# Patient Record
Sex: MALE | Race: BLACK OR AFRICAN AMERICAN
[De-identification: names, ages, dates, MRNs, and addresses within clinical notes are randomized per-mention and may not be internally consistent; named-entity substitution may affect disease eponyms.]

---

## 2018-07-02 ENCOUNTER — HOSPITAL ENCOUNTER (EMERGENCY)
Dept: HOSPITAL 84 - D.ER | Age: 33
Discharge: HOME | End: 2018-07-02
Payer: COMMERCIAL

## 2018-07-02 VITALS — SYSTOLIC BLOOD PRESSURE: 158 MMHG | DIASTOLIC BLOOD PRESSURE: 93 MMHG

## 2018-07-02 VITALS — BODY MASS INDEX: 38.36 KG/M2 | WEIGHT: 315 LBS | HEIGHT: 76 IN

## 2018-07-02 DIAGNOSIS — K85.90: ICD-10-CM

## 2018-07-02 DIAGNOSIS — R10.9: Primary | ICD-10-CM

## 2018-07-02 DIAGNOSIS — F17.200: ICD-10-CM

## 2018-07-02 LAB
ALBUMIN SERPL-MCNC: 3.4 G/DL (ref 3.4–5)
ALP SERPL-CCNC: 82 U/L (ref 46–116)
ALT SERPL-CCNC: 48 U/L (ref 10–68)
AMYLASE SERPL-CCNC: 80 U/L (ref 25–115)
ANION GAP SERPL CALC-SCNC: 14.6 MMOL/L (ref 8–16)
APPEARANCE UR: CLEAR
BACTERIA #/AREA URNS HPF: (no result) /HPF
BASOPHILS NFR BLD AUTO: 0.1 % (ref 0–2)
BILIRUB SERPL-MCNC: 0.74 MG/DL (ref 0.2–1.3)
BILIRUB SERPL-MCNC: NEGATIVE MG/DL
BUN SERPL-MCNC: 4 MG/DL (ref 7–18)
CALCIUM SERPL-MCNC: 8.7 MG/DL (ref 8.5–10.1)
CHLORIDE SERPL-SCNC: 101 MMOL/L (ref 98–107)
CO2 SERPL-SCNC: 26.8 MMOL/L (ref 21–32)
COLOR UR: YELLOW
CREAT SERPL-MCNC: 0.9 MG/DL (ref 0.6–1.3)
EOSINOPHIL NFR BLD: 0.7 % (ref 0–7)
ERYTHROCYTE [DISTWIDTH] IN BLOOD BY AUTOMATED COUNT: 14.2 % (ref 11.5–14.5)
ETHANOL SERPL-MCNC: 1 MG/DL (ref 0–10)
GLOBULIN SER-MCNC: 4 G/L
GLUCOSE SERPL-MCNC: 113 MG/DL (ref 74–106)
GLUCOSE SERPL-MCNC: NEGATIVE MG/DL
HCT VFR BLD CALC: 38.8 % (ref 42–54)
HGB BLD-MCNC: 13.3 G/DL (ref 13.5–17.5)
IMM GRANULOCYTES NFR BLD: 0.2 % (ref 0–5)
KETONES UR STRIP-MCNC: (no result) MG/DL
LIPASE SERPL-CCNC: 623 U/L (ref 73–393)
LYMPHOCYTES NFR BLD AUTO: 10.5 % (ref 15–50)
MCH RBC QN AUTO: 31.4 PG (ref 26–34)
MCHC RBC AUTO-ENTMCNC: 34.3 G/DL (ref 31–37)
MCV RBC: 91.7 FL (ref 80–100)
MONOCYTES NFR BLD: 12.7 % (ref 2–11)
MUCOUS THREADS #/AREA URNS LPF: (no result) /LPF
NEUTROPHILS NFR BLD AUTO: 75.8 % (ref 40–80)
NITRITE UR-MCNC: NEGATIVE MG/ML
OSMOLALITY SERPL CALC.SUM OF ELEC: 275 MOSM/KG (ref 275–300)
PH UR STRIP: 6 [PH] (ref 5–6)
PLATELET # BLD: 232 10X3/UL (ref 130–400)
PMV BLD AUTO: 10.1 FL (ref 7.4–10.4)
POTASSIUM SERPL-SCNC: 3.4 MMOL/L (ref 3.5–5.1)
PROT SERPL-MCNC: 7.4 G/DL (ref 6.4–8.2)
PROT UR-MCNC: (no result) MG/DL
RBC # BLD AUTO: 4.23 10X6/UL (ref 4.2–6.1)
RBC #/AREA URNS HPF: (no result) /HPF (ref 0–5)
SODIUM SERPL-SCNC: 139 MMOL/L (ref 136–145)
SP GR UR STRIP: 1.01 (ref 1–1.02)
SQUAMOUS #/AREA URNS HPF: (no result) /HPF (ref 0–5)
UROBILINOGEN UR-MCNC: NORMAL MG/DL
WBC # BLD AUTO: 14.5 10X3/UL (ref 4.8–10.8)
WBC #/AREA URNS HPF: (no result) /HPF (ref 0–5)

## 2018-11-01 ENCOUNTER — HOSPITAL ENCOUNTER (EMERGENCY)
Dept: HOSPITAL 84 - D.ER | Age: 33
Discharge: HOME | End: 2018-11-01
Payer: COMMERCIAL

## 2018-11-01 VITALS — HEIGHT: 76 IN | WEIGHT: 315 LBS | BODY MASS INDEX: 38.36 KG/M2

## 2018-11-01 VITALS — SYSTOLIC BLOOD PRESSURE: 136 MMHG | DIASTOLIC BLOOD PRESSURE: 92 MMHG

## 2018-11-01 DIAGNOSIS — M25.562: ICD-10-CM

## 2018-11-01 DIAGNOSIS — M25.511: ICD-10-CM

## 2018-11-01 DIAGNOSIS — E87.6: ICD-10-CM

## 2018-11-01 DIAGNOSIS — R07.81: ICD-10-CM

## 2018-11-01 DIAGNOSIS — R10.11: ICD-10-CM

## 2018-11-01 DIAGNOSIS — R51: ICD-10-CM

## 2018-11-01 DIAGNOSIS — K81.1: ICD-10-CM

## 2018-11-01 DIAGNOSIS — M54.2: Primary | ICD-10-CM

## 2018-11-01 LAB
ALBUMIN SERPL-MCNC: 3.7 G/DL (ref 3.4–5)
ALP SERPL-CCNC: 68 U/L (ref 46–116)
ALT SERPL-CCNC: 45 U/L (ref 10–68)
ANION GAP SERPL CALC-SCNC: 16.7 MMOL/L (ref 8–16)
APPEARANCE UR: CLEAR
APTT BLD: 28.4 SECONDS (ref 22.8–39.4)
BACTERIA #/AREA URNS HPF: (no result) /HPF
BILIRUB SERPL-MCNC: 0.28 MG/DL (ref 0.2–1.3)
BILIRUB SERPL-MCNC: NEGATIVE MG/DL
BUN SERPL-MCNC: 10 MG/DL (ref 7–18)
CALCIUM SERPL-MCNC: 8.8 MG/DL (ref 8.5–10.1)
CHLORIDE SERPL-SCNC: 104 MMOL/L (ref 98–107)
CO2 SERPL-SCNC: 22.4 MMOL/L (ref 21–32)
COLOR UR: (no result)
CREAT SERPL-MCNC: 0.9 MG/DL (ref 0.6–1.3)
ERYTHROCYTE [DISTWIDTH] IN BLOOD BY AUTOMATED COUNT: 14.3 % (ref 11.5–14.5)
ETHANOL SERPL-MCNC: 125 MG/DL (ref 0–10)
GLOBULIN SER-MCNC: 4.1 G/L
GLUCOSE SERPL-MCNC: 100 MG/DL (ref 74–106)
GLUCOSE SERPL-MCNC: NEGATIVE MG/DL
HCT VFR BLD CALC: 41.8 % (ref 42–54)
HGB BLD-MCNC: 14.5 G/DL (ref 13.5–17.5)
INR PPP: 0.98 (ref 0.85–1.17)
KETONES UR STRIP-MCNC: NEGATIVE MG/DL
LYMPHOCYTES NFR BLD AUTO: 57 % (ref 15–50)
MCH RBC QN AUTO: 32 PG (ref 26–34)
MCHC RBC AUTO-ENTMCNC: 34.7 G/DL (ref 31–37)
MCV RBC: 92.3 FL (ref 80–100)
MONOCYTES NFR BLD: 4 % (ref 2–11)
NEUTROPHILS NFR BLD AUTO: 39 % (ref 40–80)
NITRITE UR-MCNC: NEGATIVE MG/ML
OSMOLALITY SERPL CALC.SUM OF ELEC: 277 MOSM/KG (ref 275–300)
PH UR STRIP: 6 [PH] (ref 5–6)
PLATELET # BLD EST: NORMAL 10*3/UL
PLATELET # BLD: 287 10X3/UL (ref 130–400)
PMV BLD AUTO: 10.1 FL (ref 7.4–10.4)
POTASSIUM SERPL-SCNC: 3.1 MMOL/L (ref 3.5–5.1)
PROT SERPL-MCNC: 7.8 G/DL (ref 6.4–8.2)
PROT UR-MCNC: NEGATIVE MG/DL
PROTHROMBIN TIME: 12.6 SECONDS (ref 11.6–15)
RBC # BLD AUTO: 4.53 10X6/UL (ref 4.2–6.1)
RBC #/AREA URNS HPF: (no result) /HPF (ref 0–5)
SODIUM SERPL-SCNC: 140 MMOL/L (ref 136–145)
SP GR UR STRIP: 1 (ref 1–1.02)
SQUAMOUS #/AREA URNS HPF: (no result) /HPF (ref 0–5)
TARGETS BLD QL SMEAR: (no result)
UROBILINOGEN UR-MCNC: NORMAL MG/DL
WBC # BLD AUTO: 7.1 10X3/UL (ref 4.8–10.8)
WBC #/AREA URNS HPF: (no result) /HPF (ref 0–5)

## 2019-03-21 ENCOUNTER — HOSPITAL ENCOUNTER (EMERGENCY)
Dept: HOSPITAL 84 - D.ER | Age: 34
Discharge: HOME | End: 2019-03-21
Payer: COMMERCIAL

## 2019-03-21 VITALS — WEIGHT: 315 LBS | BODY MASS INDEX: 38.36 KG/M2 | HEIGHT: 76 IN

## 2019-03-21 VITALS — DIASTOLIC BLOOD PRESSURE: 62 MMHG | SYSTOLIC BLOOD PRESSURE: 139 MMHG

## 2019-03-21 DIAGNOSIS — B34.9: Primary | ICD-10-CM

## 2019-03-21 LAB
ALBUMIN SERPL-MCNC: 3.6 G/DL (ref 3.4–5)
ALP SERPL-CCNC: 75 U/L (ref 46–116)
ALT SERPL-CCNC: 31 U/L (ref 10–68)
AMYLASE SERPL-CCNC: 24 U/L (ref 25–115)
ANION GAP SERPL CALC-SCNC: 15.3 MMOL/L (ref 8–16)
BASOPHILS NFR BLD AUTO: 0.1 % (ref 0–2)
BILIRUB SERPL-MCNC: 0.67 MG/DL (ref 0.2–1.3)
BUN SERPL-MCNC: 9 MG/DL (ref 7–18)
CALCIUM SERPL-MCNC: 8.8 MG/DL (ref 8.5–10.1)
CHLORIDE SERPL-SCNC: 103 MMOL/L (ref 98–107)
CO2 SERPL-SCNC: 25.3 MMOL/L (ref 21–32)
CREAT SERPL-MCNC: 0.8 MG/DL (ref 0.6–1.3)
EOSINOPHIL NFR BLD: 1.8 % (ref 0–7)
ERYTHROCYTE [DISTWIDTH] IN BLOOD BY AUTOMATED COUNT: 14 % (ref 11.5–14.5)
GLOBULIN SER-MCNC: 4.2 G/L
GLUCOSE SERPL-MCNC: 120 MG/DL (ref 74–106)
HCT VFR BLD CALC: 37.4 % (ref 42–54)
HGB BLD-MCNC: 12.9 G/DL (ref 13.5–17.5)
IMM GRANULOCYTES NFR BLD: 0.1 % (ref 0–5)
LIPASE SERPL-CCNC: 152 U/L (ref 73–393)
LYMPHOCYTES NFR BLD AUTO: 24.1 % (ref 15–50)
MCH RBC QN AUTO: 31.1 PG (ref 26–34)
MCHC RBC AUTO-ENTMCNC: 34.5 G/DL (ref 31–37)
MCV RBC: 90.1 FL (ref 80–100)
MONOCYTES NFR BLD: 12.4 % (ref 2–11)
NEUTROPHILS NFR BLD AUTO: 61.5 % (ref 40–80)
OSMOLALITY SERPL CALC.SUM OF ELEC: 278 MOSM/KG (ref 275–300)
PLATELET # BLD: 242 10X3/UL (ref 130–400)
PMV BLD AUTO: 10 FL (ref 7.4–10.4)
POTASSIUM SERPL-SCNC: 3.6 MMOL/L (ref 3.5–5.1)
PROT SERPL-MCNC: 7.8 G/DL (ref 6.4–8.2)
RBC # BLD AUTO: 4.15 10X6/UL (ref 4.2–6.1)
SODIUM SERPL-SCNC: 140 MMOL/L (ref 136–145)
TROPONIN I SERPL-MCNC: < 0.017 NG/ML (ref 0–0.06)
WBC # BLD AUTO: 8.7 10X3/UL (ref 4.8–10.8)

## 2019-08-02 ENCOUNTER — HOSPITAL ENCOUNTER (EMERGENCY)
Dept: HOSPITAL 84 - D.ER | Age: 34
Discharge: HOME | End: 2019-08-02
Payer: COMMERCIAL

## 2019-08-02 VITALS — SYSTOLIC BLOOD PRESSURE: 116 MMHG | DIASTOLIC BLOOD PRESSURE: 69 MMHG

## 2019-08-02 VITALS
BODY MASS INDEX: 38.36 KG/M2 | WEIGHT: 315 LBS | HEIGHT: 76 IN | BODY MASS INDEX: 38.36 KG/M2 | HEIGHT: 76 IN | WEIGHT: 315 LBS

## 2019-08-02 DIAGNOSIS — R10.12: Primary | ICD-10-CM

## 2019-08-02 DIAGNOSIS — K29.20: ICD-10-CM

## 2019-08-02 LAB
ALBUMIN SERPL-MCNC: 3.3 G/DL (ref 3.4–5)
ALP SERPL-CCNC: 77 U/L (ref 46–116)
ALT SERPL-CCNC: 36 U/L (ref 10–68)
AMYLASE SERPL-CCNC: 31 U/L (ref 25–115)
ANION GAP SERPL CALC-SCNC: 12.7 MMOL/L (ref 8–16)
APPEARANCE UR: CLEAR
BASOPHILS NFR BLD AUTO: 0.3 % (ref 0–2)
BILIRUB SERPL-MCNC: 0.23 MG/DL (ref 0.2–1.3)
BILIRUB SERPL-MCNC: NEGATIVE MG/DL
BUN SERPL-MCNC: 14 MG/DL (ref 7–18)
CALCIUM SERPL-MCNC: 8.8 MG/DL (ref 8.5–10.1)
CHLORIDE SERPL-SCNC: 103 MMOL/L (ref 98–107)
CO2 SERPL-SCNC: 25.2 MMOL/L (ref 21–32)
COLOR UR: YELLOW
CREAT SERPL-MCNC: 0.9 MG/DL (ref 0.6–1.3)
EOSINOPHIL NFR BLD: 1.7 % (ref 0–7)
ERYTHROCYTE [DISTWIDTH] IN BLOOD BY AUTOMATED COUNT: 14.5 % (ref 11.5–14.5)
GLOBULIN SER-MCNC: 4.2 G/L
GLUCOSE SERPL-MCNC: 109 MG/DL (ref 74–106)
GLUCOSE SERPL-MCNC: NEGATIVE MG/DL
HCT VFR BLD CALC: 38.5 % (ref 42–54)
HGB BLD-MCNC: 13.2 G/DL (ref 13.5–17.5)
IMM GRANULOCYTES NFR BLD: 0.3 % (ref 0–5)
KETONES UR STRIP-MCNC: NEGATIVE MG/DL
LIPASE SERPL-CCNC: 172 U/L (ref 73–393)
LYMPHOCYTES NFR BLD AUTO: 29.8 % (ref 15–50)
MCH RBC QN AUTO: 30.6 PG (ref 26–34)
MCHC RBC AUTO-ENTMCNC: 34.3 G/DL (ref 31–37)
MCV RBC: 89.3 FL (ref 80–100)
MONOCYTES NFR BLD: 10.9 % (ref 2–11)
NEUTROPHILS NFR BLD AUTO: 57 % (ref 40–80)
NITRITE UR-MCNC: NEGATIVE MG/ML
OSMOLALITY SERPL CALC.SUM OF ELEC: 275 MOSM/KG (ref 275–300)
PH UR STRIP: 5 [PH] (ref 5–6)
PLATELET # BLD: 283 10X3/UL (ref 130–400)
PMV BLD AUTO: 9.5 FL (ref 7.4–10.4)
POTASSIUM SERPL-SCNC: 3.9 MMOL/L (ref 3.5–5.1)
PROT SERPL-MCNC: 7.5 G/DL (ref 6.4–8.2)
PROT UR-MCNC: NEGATIVE MG/DL
RBC # BLD AUTO: 4.31 10X6/UL (ref 4.2–6.1)
SODIUM SERPL-SCNC: 137 MMOL/L (ref 136–145)
SP GR UR STRIP: 1.01 (ref 1–1.02)
TROPONIN I SERPL-MCNC: < 0.017 NG/ML (ref 0–0.06)
UROBILINOGEN UR-MCNC: NORMAL MG/DL
WBC # BLD AUTO: 7.6 10X3/UL (ref 4.8–10.8)

## 2019-10-17 ENCOUNTER — HOSPITAL ENCOUNTER (INPATIENT)
Dept: HOSPITAL 84 - D.ER | Age: 34
LOS: 5 days | Discharge: HOME | DRG: 438 | End: 2019-10-22
Attending: FAMILY MEDICINE | Admitting: FAMILY MEDICINE
Payer: COMMERCIAL

## 2019-10-17 VITALS
BODY MASS INDEX: 38.36 KG/M2 | BODY MASS INDEX: 38.36 KG/M2 | WEIGHT: 315 LBS | HEIGHT: 76 IN | WEIGHT: 315 LBS | BODY MASS INDEX: 38.36 KG/M2 | HEIGHT: 76 IN

## 2019-10-17 VITALS — SYSTOLIC BLOOD PRESSURE: 134 MMHG | DIASTOLIC BLOOD PRESSURE: 88 MMHG

## 2019-10-17 DIAGNOSIS — D64.9: ICD-10-CM

## 2019-10-17 DIAGNOSIS — E66.01: ICD-10-CM

## 2019-10-17 DIAGNOSIS — A41.9: ICD-10-CM

## 2019-10-17 DIAGNOSIS — R19.7: ICD-10-CM

## 2019-10-17 DIAGNOSIS — F10.10: ICD-10-CM

## 2019-10-17 DIAGNOSIS — E87.1: ICD-10-CM

## 2019-10-17 DIAGNOSIS — E87.6: ICD-10-CM

## 2019-10-17 DIAGNOSIS — K85.20: Primary | ICD-10-CM

## 2019-10-17 DIAGNOSIS — F17.213: ICD-10-CM

## 2019-10-17 LAB
ALBUMIN SERPL-MCNC: 3.7 G/DL (ref 3.4–5)
ALP SERPL-CCNC: 89 U/L (ref 46–116)
ALT SERPL-CCNC: 45 U/L (ref 10–68)
AMYLASE SERPL-CCNC: 467 U/L (ref 25–115)
ANION GAP SERPL CALC-SCNC: 16 MMOL/L (ref 8–16)
APPEARANCE UR: CLEAR
BACTERIA #/AREA URNS HPF: (no result) /HPF
BILIRUB SERPL-MCNC: 0.51 MG/DL (ref 0.2–1.3)
BILIRUB SERPL-MCNC: NEGATIVE MG/DL
BUN SERPL-MCNC: 8 MG/DL (ref 7–18)
CALCIUM SERPL-MCNC: 9.1 MG/DL (ref 8.5–10.1)
CHLORIDE SERPL-SCNC: 100 MMOL/L (ref 98–107)
CO2 SERPL-SCNC: 24.3 MMOL/L (ref 21–32)
COLOR UR: YELLOW
CREAT SERPL-MCNC: 0.8 MG/DL (ref 0.6–1.3)
DACRYOCYTES BLD QL SMEAR: (no result)
ELLIPTOCYTES BLD QL SMEAR: (no result)
ERYTHROCYTE [DISTWIDTH] IN BLOOD BY AUTOMATED COUNT: 14.3 % (ref 11.5–14.5)
GLOBULIN SER-MCNC: 4.6 G/L
GLUCOSE SERPL-MCNC: 113 MG/DL (ref 74–106)
GLUCOSE SERPL-MCNC: NEGATIVE MG/DL
HCT VFR BLD CALC: 42.2 % (ref 42–54)
HGB BLD-MCNC: 14.2 G/DL (ref 13.5–17.5)
KETONES UR STRIP-MCNC: NEGATIVE MG/DL
LIPASE SERPL-CCNC: 4727 U/L (ref 73–393)
LYMPHOCYTES NFR BLD AUTO: 17 % (ref 15–50)
MCH RBC QN AUTO: 31.1 PG (ref 26–34)
MCHC RBC AUTO-ENTMCNC: 33.6 G/DL (ref 31–37)
MCV RBC: 92.3 FL (ref 80–100)
MONOCYTES NFR BLD: 5 % (ref 2–11)
NEUTROPHILS NFR BLD AUTO: 75 % (ref 40–80)
NITRITE UR-MCNC: NEGATIVE MG/ML
OSMOLALITY SERPL CALC.SUM OF ELEC: 272 MOSM/KG (ref 275–300)
PH UR STRIP: 6 [PH] (ref 5–6)
PLATELET # BLD EST: NORMAL 10*3/UL
PLATELET # BLD: 315 10X3/UL (ref 130–400)
PMV BLD AUTO: 10.1 FL (ref 7.4–10.4)
POTASSIUM SERPL-SCNC: 3.3 MMOL/L (ref 3.5–5.1)
PROT SERPL-MCNC: 8.3 G/DL (ref 6.4–8.2)
PROT UR-MCNC: (no result) MG/DL
RBC # BLD AUTO: 4.57 10X6/UL (ref 4.2–6.1)
RBC #/AREA URNS HPF: (no result) /HPF (ref 0–5)
SODIUM SERPL-SCNC: 137 MMOL/L (ref 136–145)
SP GR UR STRIP: 1 (ref 1–1.02)
SQUAMOUS #/AREA URNS HPF: (no result) /HPF (ref 0–5)
TARGETS BLD QL SMEAR: (no result)
TROPONIN I SERPL-MCNC: < 0.017 NG/ML (ref 0–0.06)
UROBILINOGEN UR-MCNC: NORMAL MG/DL
WBC # BLD AUTO: 11.6 10X3/UL (ref 4.8–10.8)
WBC #/AREA URNS HPF: (no result) /HPF

## 2019-10-17 NOTE — NUR
NEW ADMIT TO [facility] 3 TO DR SMITH FOR PANCREATITIS FROM ED. COOPERATIVE WITH
ADMISSION ASSESSMENTS. C/O LUQ PAIN. PCA PUMP SET UP AND PATIENT EDUCATED.
PATIENT VERBALIZED UNDERSTANDING. DENIES HAVING ANY OTHER NEEDS AT TIME.
RESTING IN BED WITH EYES OPEN. BED IN LOWEST POSITON. SIDE RAILS UP. CALL
LIGHT IN REACH. WILL CONTINUE TO MONITOR.

## 2019-10-18 VITALS — SYSTOLIC BLOOD PRESSURE: 143 MMHG | DIASTOLIC BLOOD PRESSURE: 55 MMHG

## 2019-10-18 VITALS — SYSTOLIC BLOOD PRESSURE: 156 MMHG | DIASTOLIC BLOOD PRESSURE: 86 MMHG

## 2019-10-18 VITALS — DIASTOLIC BLOOD PRESSURE: 73 MMHG | SYSTOLIC BLOOD PRESSURE: 120 MMHG

## 2019-10-18 VITALS — DIASTOLIC BLOOD PRESSURE: 74 MMHG | SYSTOLIC BLOOD PRESSURE: 128 MMHG

## 2019-10-18 VITALS — DIASTOLIC BLOOD PRESSURE: 78 MMHG | SYSTOLIC BLOOD PRESSURE: 129 MMHG

## 2019-10-18 VITALS — DIASTOLIC BLOOD PRESSURE: 85 MMHG | SYSTOLIC BLOOD PRESSURE: 139 MMHG

## 2019-10-18 LAB
ANION GAP SERPL CALC-SCNC: 12.6 MMOL/L (ref 8–16)
BASOPHILS NFR BLD AUTO: 0.1 % (ref 0–2)
BUN SERPL-MCNC: 6 MG/DL (ref 7–18)
CALCIUM SERPL-MCNC: 8.7 MG/DL (ref 8.5–10.1)
CHLORIDE SERPL-SCNC: 102 MMOL/L (ref 98–107)
CHOLEST/HDLC SERPL: 2 RATIO (ref 2.3–4.9)
CO2 SERPL-SCNC: 25.8 MMOL/L (ref 21–32)
CREAT SERPL-MCNC: 0.7 MG/DL (ref 0.6–1.3)
EOSINOPHIL NFR BLD: 0.4 % (ref 0–7)
ERYTHROCYTE [DISTWIDTH] IN BLOOD BY AUTOMATED COUNT: 14.2 % (ref 11.5–14.5)
GLUCOSE SERPL-MCNC: 135 MG/DL (ref 74–106)
HCT VFR BLD CALC: 40.4 % (ref 42–54)
HDLC SERPL-MCNC: 56 MG/DL (ref 32–96)
HGB BLD-MCNC: 13.5 G/DL (ref 13.5–17.5)
IMM GRANULOCYTES NFR BLD: 0.3 % (ref 0–5)
LDL-HDL RATIO: 0.8 RATIO (ref 1.5–3.5)
LDLC SERPL-MCNC: 46 MG/DL (ref 0–100)
LYMPHOCYTES NFR BLD AUTO: 20.1 % (ref 15–50)
MAGNESIUM SERPL-MCNC: 2.1 MG/DL (ref 1.8–2.4)
MCH RBC QN AUTO: 30.8 PG (ref 26–34)
MCHC RBC AUTO-ENTMCNC: 33.4 G/DL (ref 31–37)
MCV RBC: 92 FL (ref 80–100)
MONOCYTES NFR BLD: 10.3 % (ref 2–11)
NEUTROPHILS NFR BLD AUTO: 68.8 % (ref 40–80)
OSMOLALITY SERPL CALC.SUM OF ELEC: 273 MOSM/KG (ref 275–300)
PHOSPHATE SERPL-MCNC: 3.7 MG/DL (ref 2.5–4.9)
PLATELET # BLD: 315 10X3/UL (ref 130–400)
PMV BLD AUTO: 10.1 FL (ref 7.4–10.4)
POTASSIUM SERPL-SCNC: 3.4 MMOL/L (ref 3.5–5.1)
RBC # BLD AUTO: 4.39 10X6/UL (ref 4.2–6.1)
SODIUM SERPL-SCNC: 137 MMOL/L (ref 136–145)
TRIGL SERPL-MCNC: 43 MG/DL (ref 30–200)
WBC # BLD AUTO: 11.3 10X3/UL (ref 4.8–10.8)

## 2019-10-18 NOTE — MORECARE
CASE MANAGEMENT DISCHARGE SUMMARY
 
 
PATIENT: JU DEE                       UNIT: [id number]
ACCOUNT#: [id number]                       ADM DATE: 10/17/19
AGE: 34     : [birth year]  SEX: M            ROOM/BED: D.1213    
AUTHOR: PAULINO JIMENEZ                             PHYSICIAN:                               
 
REFERRING PHYSICIAN: ERIKA SMITH MD                
DATE OF SERVICE: 10/18/19
Discharge Plan
 
 
Patient Name: JU DEE
Facility: [facility]
Encounter #: [id number]
Medical Record #: [id number]
: 1985
Planned Disposition: Home
Anticipated Discharge Date: 
 
Discharge Date: 
Expected LOS: 
Initial Reviewer: [email or web address]
Initial Review Date: 10/18/2019
Generated: 10/18/19   3:29 pm 
Comments
 
DCP- Discharge Planning
 
Updated by [id number]: Blanca Guzmán on 10/18/19   1:20 pm CT
Patient Name: JU DEE                                     
Admission Status: ER   
Accout number: [id number]                              
Admission Date: 10-   
: 1985                                                        
Admission Diagnosis:   
Attending: ERIKA SMITH                                                
Current LOS:  1   
  
Anticipated DC Date:    
Planned Disposition: Home   
Primary Insurance: [employer] PPO   
  
  
Discharge Planning Comments: CM MET WITH PATIENT AFTER OBTAINING VERBAL 
CONSENT. STATES PLANS TO DISCHARGE TO HOME. DISCUSSED NEED FOR HH, REHAB OR 
EQUIPMENT, PATIENT STATES NO NEEDS. CM WILL FOLLOW AND ASSIST AS NEEDED.   
  
  
 
  
  
  
  
: Blanca Guzmán
 DCPIA - Discharge Planning Initial Assessment
 
Updated by [id number]: Blanca Guzmán on 10/18/19   2:19 pm
*  Is the patient Alert and Oriented?
Yes
*  PCP
NONE
*  Pharmacy
[facility]
*  Preadmission Environment
Home with Family
*  ADLs
Independent
*  Other Equipment
NONE
*  List name and contact numbers for known caregivers / representatives who 
currently or will assist patient after discharge:
BELENREANN, SPOUSE, [phone]
*  Please name any agencies selected above.
NONE
*  Additional services required to return to the preadmission environment?
No
*  Can the patient safely return to the preadmission environment?
Yes
*  Has this patient been hospitalized within the prior 30 days at any 
hospital?
No
 
 
 
 
 
 
Patient Name: JU DEE
 
Encounter #: [id number]
Page [id number]
 
 
 
 
 
Electronically Signed by PAULINO JIMENEZ on 10/18/19 at 1430
 
 
 
 
 
 
**All edits/amendments must be made on the electronic document**
 
DICTATION DATE: 10/18/19 1429     : DM  10/18/19 1429     
RPT#: [id number]                                DC DATE:        
                                               STATUS: ADM IN  
[facility]
 [place] [address]
***END OF REPORT***

## 2019-10-18 NOTE — NUR
INITIAL ROUNDS COMPLETED AT 1910 HRS.  PT SITTING ON SIDE OF BED.  NO OBVIOUS
DISTRESS NOTED.  ASSESSMENT COMPLETED AT 1950 HRS.  IV TO LFA WITH NS AT
125CC/HR AND MORPHNE PCA 1MG Q 10 MINUTES WITH 10 MG Q4HR LOCK OUOT.  IV
PATENT.  LUNGS ESSENTIALLY CTA.  ABD TENDER WITH ACTIVE BS NOTED.  METZGER.
PALPABLE PERIPHERAL PULSES.  VSS EXCEPT TEMP 100.6.  TYLENOL 650MG PO GIVEN
FOR ELEVATED TEMP.  PT IN SHOWER AT 2100 HRS.  OUT OF SHOWER AT 2130 HRS.
STATES FEELING SLIGHTLY BETTER.  CALL LIGHT WITHIN REACH.

## 2019-10-18 NOTE — NUR
CALLED JACOBY AND DR SMITH. PATIENT IS WANTING MORE PAIN MEDS. JACOBY SAID HE
IS AT MAX DOSE AND CAN'T HAVE ANYMORE. ATIVAN WAS ORDERED FOR HIM AND GIVEN.
PATIENT WAS YELLING ABOUT WANTING MORE PAIN MEDS BUT, IS ACTING MORE
REASONABLE AT THIS TIME. I EXPLAINED THE ATIVAN WOULD CALM HIM DOWN AND WOULD
HELP HIS PAIN WHEN HE WAS MORE RELAXED.

## 2019-10-18 NOTE — NUR
PATIENT RESTING IN BED WITH EYES CLOSED. NO SIGNS OF DISTRESS. BED IN LOWEST
POSITION. SIDE RAILS UP. CALL LIGHT IN REACH. WILL CONTINUE TO MONITOR.

## 2019-10-19 VITALS — DIASTOLIC BLOOD PRESSURE: 76 MMHG | SYSTOLIC BLOOD PRESSURE: 142 MMHG

## 2019-10-19 VITALS — SYSTOLIC BLOOD PRESSURE: 157 MMHG | DIASTOLIC BLOOD PRESSURE: 66 MMHG

## 2019-10-19 LAB
AMYLASE SERPL-CCNC: 132 U/L (ref 25–115)
ANION GAP SERPL CALC-SCNC: 13.8 MMOL/L (ref 8–16)
BASOPHILS NFR BLD AUTO: 0.1 % (ref 0–2)
BUN SERPL-MCNC: 5 MG/DL (ref 7–18)
CALCIUM SERPL-MCNC: 8.4 MG/DL (ref 8.5–10.1)
CHLORIDE SERPL-SCNC: 100 MMOL/L (ref 98–107)
CO2 SERPL-SCNC: 23.8 MMOL/L (ref 21–32)
CREAT SERPL-MCNC: 0.8 MG/DL (ref 0.6–1.3)
EOSINOPHIL NFR BLD: 0.1 % (ref 0–7)
ERYTHROCYTE [DISTWIDTH] IN BLOOD BY AUTOMATED COUNT: 14.2 % (ref 11.5–14.5)
GLUCOSE SERPL-MCNC: 133 MG/DL (ref 74–106)
HCT VFR BLD CALC: 37.7 % (ref 42–54)
HGB BLD-MCNC: 12.8 G/DL (ref 13.5–17.5)
IMM GRANULOCYTES NFR BLD: 0.7 % (ref 0–5)
LIPASE SERPL-CCNC: 614 U/L (ref 73–393)
LYMPHOCYTES NFR BLD AUTO: 8.6 % (ref 15–50)
MAGNESIUM SERPL-MCNC: 2.3 MG/DL (ref 1.8–2.4)
MCH RBC QN AUTO: 31.1 PG (ref 26–34)
MCHC RBC AUTO-ENTMCNC: 34 G/DL (ref 31–37)
MCV RBC: 91.7 FL (ref 80–100)
MONOCYTES NFR BLD: 13.9 % (ref 2–11)
NEUTROPHILS NFR BLD AUTO: 76.6 % (ref 40–80)
OSMOLALITY SERPL CALC.SUM OF ELEC: 266 MOSM/KG (ref 275–300)
PHOSPHATE SERPL-MCNC: 2.5 MG/DL (ref 2.5–4.9)
PLATELET # BLD: 273 10X3/UL (ref 130–400)
PMV BLD AUTO: 9.8 FL (ref 7.4–10.4)
POTASSIUM SERPL-SCNC: 3.6 MMOL/L (ref 3.5–5.1)
RBC # BLD AUTO: 4.11 10X6/UL (ref 4.2–6.1)
SODIUM SERPL-SCNC: 134 MMOL/L (ref 136–145)
WBC # BLD AUTO: 19.3 10X3/UL (ref 4.8–10.8)

## 2019-10-19 NOTE — NUR
PATIENT UP TO SHOWER MULTIPLE TIMES AND PATIENT DISCONNECTED ON IV EACH TIME.
SPENT SEVERAL MINUTES WITH PATIENT TODAY EDUCATING PATIENT ABOUT NOT DCD OWN
IV AND TO LET NURSE KNOW. PATIENT REFUSED MULTIPLE TIMES AND REPEATEDLY
SHOWERED AND DISCONNECTED OWN IV. SPOKE WITH [facility]. RECIEVED ORDER TO DC
MORPHINE AND IV FLUIDS AND SL IV. ORDER RECEIVED FOR MORPHINE 1 - 4 MG IV
EVERY 4 HOURS PRN PAIN.

## 2019-10-19 NOTE — NUR
INITIAL ROUNDS COMPLETED.  PT RESTING WITH EYES CLOSED.  RESP EVEN AND
REGULAR.  SRUP X2, CALL LIGHT WITHIN REACH.

## 2019-10-19 NOTE — NUR
PT UNALBE TO BE WSTILL.  STILL HAS C/O ABD PAIN WITH TYLENOL AND MORPHINE PCA.
 FRIEND AT BEDSIDE.  CALL LIGHT WITHIN REACH.

## 2019-10-19 NOTE — NUR
PT WATCHNG TV.  NO DISTRESS NOTED.  PT TOOK ANOTHER SHOWER 30MINUTES AGO.
STAES HELPS THE BURNING SENSATION IN HIS ABD.  CALL LIGHT WITHIN REACH.

## 2019-10-19 NOTE — NUR
NEW IV STARTED #20 TO L HAND WITH ATEMPT X3.  PT TOLERATED ACTIVITY WELL.  MVI
RESTARTED AT 125CC/HR.

## 2019-10-19 NOTE — NUR
REPORT RECEIVED FROM NIGHT SHIFT AND PATIENT CARE ASSUMED. PATIENT LAYING IN
BED ON RT SIDE AWAKE, ALERT AND ORIENTED X 4. PATIENT DENIES ANY NEEDS OR
PAIN. MORPHINE PCA IS MAXED OUT AND LOCKED OUT. FAMILY MEMBER ASLEEP IN BS
CHAIR. WILL CONTINUE WITH PLAN OF CARE. SR UP X 2 BED IN LOW POSITION AND CALL
LIGHT IN REACH.

## 2019-10-19 NOTE — NUR
ASSESSMENT COMPLETED AT 1920 HRS.  TEMP 101.2 AND  EVEN AND REGULAR.
ALERT AND ORIENTED TO PERSON, PLACE AND TIME.  METZGER.  LUNGS DIMINISHED IN BASES
BILAT.  ABD TENDER WITH ACTIVE BS NOTED.  NO IV.  TYLENOL 650MG PO GIVEN FOR
ELEVATED TEMP.  IV STARTED #22 TO RINNER FA WITH ATTENPT X1.  PT TOLERATED
ACTIVITY WELL. PT REFUSED PM BANANA BAG AND IV FLUIDS.  WILL CONTINUE TO
MONITOR.  CALL LIGHT WITHIN REACH.

## 2019-10-19 NOTE — NUR
HEARD IV PUMP BEEPING. ENTERED PATIENTS ROOM. PATIENT HAD ONCE AGAIN
DISCONNECTED IV AND WAS IN THE SHOWER.

## 2019-10-19 NOTE — NUR
BACK IN PATIENTS ROOM. [facility] OUT OF SHOWER, [facility] IN TO DR ON CALL TO REQUEST MORPHINE PCA BE DCD AND IV FLUIDS.
PATIENT IS DRINKING CLEAR LIQUIDS WITHOUT DIFFICULTY.

## 2019-10-20 VITALS — SYSTOLIC BLOOD PRESSURE: 145 MMHG | DIASTOLIC BLOOD PRESSURE: 79 MMHG

## 2019-10-20 VITALS — SYSTOLIC BLOOD PRESSURE: 136 MMHG | DIASTOLIC BLOOD PRESSURE: 72 MMHG

## 2019-10-20 VITALS — DIASTOLIC BLOOD PRESSURE: 82 MMHG | SYSTOLIC BLOOD PRESSURE: 138 MMHG

## 2019-10-20 VITALS — DIASTOLIC BLOOD PRESSURE: 75 MMHG | SYSTOLIC BLOOD PRESSURE: 131 MMHG

## 2019-10-20 VITALS — DIASTOLIC BLOOD PRESSURE: 51 MMHG | SYSTOLIC BLOOD PRESSURE: 122 MMHG

## 2019-10-20 VITALS — DIASTOLIC BLOOD PRESSURE: 77 MMHG | SYSTOLIC BLOOD PRESSURE: 112 MMHG

## 2019-10-20 LAB
AMYLASE SERPL-CCNC: 35 U/L (ref 25–115)
ANION GAP SERPL CALC-SCNC: 11.4 MMOL/L (ref 8–16)
BUN SERPL-MCNC: 6 MG/DL (ref 7–18)
CALCIUM SERPL-MCNC: 8.8 MG/DL (ref 8.5–10.1)
CHLORIDE SERPL-SCNC: 100 MMOL/L (ref 98–107)
CO2 SERPL-SCNC: 25.9 MMOL/L (ref 21–32)
CREAT SERPL-MCNC: 0.8 MG/DL (ref 0.6–1.3)
ERYTHROCYTE [DISTWIDTH] IN BLOOD BY AUTOMATED COUNT: 14.2 % (ref 11.5–14.5)
GLUCOSE SERPL-MCNC: 128 MG/DL (ref 74–106)
HCT VFR BLD CALC: 36.2 % (ref 42–54)
HGB BLD-MCNC: 12.3 G/DL (ref 13.5–17.5)
LIPASE SERPL-CCNC: 160 U/L (ref 73–393)
LYMPHOCYTES NFR BLD AUTO: 10 % (ref 15–50)
MAGNESIUM SERPL-MCNC: 2.4 MG/DL (ref 1.8–2.4)
MCH RBC QN AUTO: 31.2 PG (ref 26–34)
MCHC RBC AUTO-ENTMCNC: 34 G/DL (ref 31–37)
MCV RBC: 91.9 FL (ref 80–100)
MONOCYTES NFR BLD: 8 % (ref 2–11)
NEUTROPHILS NFR BLD AUTO: 71 % (ref 40–80)
OSMOLALITY SERPL CALC.SUM OF ELEC: 267 MOSM/KG (ref 275–300)
PHOSPHATE SERPL-MCNC: 1.9 MG/DL (ref 2.5–4.9)
PLATELET # BLD EST: NORMAL 10*3/UL
PLATELET # BLD: 262 10X3/UL (ref 130–400)
PMV BLD AUTO: 9.8 FL (ref 7.4–10.4)
POTASSIUM SERPL-SCNC: 3.3 MMOL/L (ref 3.5–5.1)
RBC # BLD AUTO: 3.94 10X6/UL (ref 4.2–6.1)
SODIUM SERPL-SCNC: 134 MMOL/L (ref 136–145)
WBC # BLD AUTO: 21.8 10X3/UL (ref 4.8–10.8)

## 2019-10-20 NOTE — NUR
REPORT RECEIVED FROM NIGHT SHIFT AND PATIENT CARE ASSUMED. PATIENT LAYING IN
BED ON BACK WITH EYES CLOSED AND BREATHING EVENLY. FAMILY ALSEEP IN BS CHAIR.
WILL CONTINUE WITH PLAN OF CARE. SRUP X 2 BED IN LOW POSITION AND CALL LIGHT
IN REACH.

## 2019-10-20 NOTE — NUR
TEMP 101.  EVEN AND REGULAR.  UNALBE TO ADMINISTER TYLENOL UNTIL 0130
HRS.  PT STASTED HE IS GONG TO TAKE A SHOWER. TOWELS GIVEN.  STATES ABD PAIN
2/10 AT THIS TIME.  CALL LIGHT WITHIN REACH.

## 2019-10-20 NOTE — NUR
PATIENT LAYING IN BED ON STOMACH WITH EYES CLOSED  AND BREATHING EVENLY WITH
AUDIBLE SNORING. FAMILY AT BS. WILL CONTINUE TO MONITOR. SR UP X 2 BED IN LOW
POSITION AND CALL LIGHT IN REACH.

## 2019-10-20 NOTE — NUR
PATIENT AMBULATES TO NURSES STATION. STATES HE WANTS TO GO HOME AND
QUESTIONING WHEN HE COULD. INFORMED PATIENT THAT DR WILL ROUNDING AND DR WILL
DISCUSS WITH HIM.

## 2019-10-20 NOTE — NUR
PATIENT RESTING IN BED AND DENIES NEEDS AT THIS TIME. BED IN LOWEST POSITION
AND CALL LIGHT WITHIN REACH. ENCOURAGED THE PATIENT TO CALL IF SHE HAS NEEDS.
WILL CONTINUE TO MONITOR.

## 2019-10-20 NOTE — NUR
ENTERED PATIENTS ROOM TO GIVE IV MEDS . PATIENT HAS ONCE AGAIN-4TH TIME-
PATIENT HAS REMOVED.QUESTIONED PATIENT ABOUT WHY HE REMOVED IV AGAIN, HE
STATES THAT HE THOUGHT IT WASNT NEEDED ANYMORE. CALLED [facility]ONE[facility]ZIER AND RECEIVED
ORDER FOR PO LEVOFLOXIN 750 MG.

## 2019-10-21 VITALS — SYSTOLIC BLOOD PRESSURE: 118 MMHG | DIASTOLIC BLOOD PRESSURE: 75 MMHG

## 2019-10-21 VITALS — DIASTOLIC BLOOD PRESSURE: 75 MMHG | SYSTOLIC BLOOD PRESSURE: 120 MMHG

## 2019-10-21 VITALS — SYSTOLIC BLOOD PRESSURE: 138 MMHG | DIASTOLIC BLOOD PRESSURE: 83 MMHG

## 2019-10-21 LAB
AMYLASE SERPL-CCNC: 43 U/L (ref 25–115)
ANION GAP SERPL CALC-SCNC: 14.3 MMOL/L (ref 8–16)
BASOPHILS NFR BLD AUTO: 0.2 % (ref 0–2)
BUN SERPL-MCNC: 9 MG/DL (ref 7–18)
CALCIUM SERPL-MCNC: 9 MG/DL (ref 8.5–10.1)
CHLORIDE SERPL-SCNC: 101 MMOL/L (ref 98–107)
CO2 SERPL-SCNC: 24.8 MMOL/L (ref 21–32)
CREAT SERPL-MCNC: 0.8 MG/DL (ref 0.6–1.3)
EOSINOPHIL NFR BLD: 1 % (ref 0–7)
ERYTHROCYTE [DISTWIDTH] IN BLOOD BY AUTOMATED COUNT: 14.5 % (ref 11.5–14.5)
GLUCOSE SERPL-MCNC: 131 MG/DL (ref 74–106)
HCT VFR BLD CALC: 35.4 % (ref 42–54)
HGB BLD-MCNC: 12 G/DL (ref 13.5–17.5)
IMM GRANULOCYTES NFR BLD: 0.2 % (ref 0–5)
LIPASE SERPL-CCNC: 356 U/L (ref 73–393)
LYMPHOCYTES NFR BLD AUTO: 14.6 % (ref 15–50)
MAGNESIUM SERPL-MCNC: 2.3 MG/DL (ref 1.8–2.4)
MCH RBC QN AUTO: 30.8 PG (ref 26–34)
MCHC RBC AUTO-ENTMCNC: 33.9 G/DL (ref 31–37)
MCV RBC: 90.8 FL (ref 80–100)
MONOCYTES NFR BLD: 11.5 % (ref 2–11)
NEUTROPHILS NFR BLD AUTO: 72.5 % (ref 40–80)
OSMOLALITY SERPL CALC.SUM OF ELEC: 274 MOSM/KG (ref 275–300)
PHOSPHATE SERPL-MCNC: 1.5 MG/DL (ref 2.5–4.9)
PLATELET # BLD: 331 10X3/UL (ref 130–400)
PMV BLD AUTO: 10.5 FL (ref 7.4–10.4)
POTASSIUM SERPL-SCNC: 3.1 MMOL/L (ref 3.5–5.1)
RBC # BLD AUTO: 3.9 10X6/UL (ref 4.2–6.1)
SODIUM SERPL-SCNC: 137 MMOL/L (ref 136–145)
WBC # BLD AUTO: 14.7 10X3/UL (ref 4.8–10.8)

## 2019-10-21 NOTE — NUR
Nutrition Follow-up:
Chart reviewed. Per MD notes yesterday, pancreatic enzymes were normal. Noted
pt with diarrhea, MD is sending stool for CDT test. Noted pt on Levaquin. Labs
noted, lyte replacement PRN noted in meds. Glucose has been elevated since
admit, consider testing A1c. Consider transition to CHO consistent diet. PO
intake has been 100% since admit. RD Following.

## 2019-10-22 VITALS — SYSTOLIC BLOOD PRESSURE: 121 MMHG | DIASTOLIC BLOOD PRESSURE: 68 MMHG

## 2019-10-22 VITALS — SYSTOLIC BLOOD PRESSURE: 100 MMHG | DIASTOLIC BLOOD PRESSURE: 56 MMHG

## 2019-10-22 VITALS — DIASTOLIC BLOOD PRESSURE: 59 MMHG | SYSTOLIC BLOOD PRESSURE: 97 MMHG

## 2019-10-22 LAB
ANION GAP SERPL CALC-SCNC: 14.7 MMOL/L (ref 8–16)
BASOPHILS NFR BLD AUTO: 0.3 % (ref 0–2)
BUN SERPL-MCNC: 11 MG/DL (ref 7–18)
CALCIUM SERPL-MCNC: 8.7 MG/DL (ref 8.5–10.1)
CHLORIDE SERPL-SCNC: 101 MMOL/L (ref 98–107)
CO2 SERPL-SCNC: 25.6 MMOL/L (ref 21–32)
CREAT SERPL-MCNC: 0.7 MG/DL (ref 0.6–1.3)
EOSINOPHIL NFR BLD: 2.2 % (ref 0–7)
ERYTHROCYTE [DISTWIDTH] IN BLOOD BY AUTOMATED COUNT: 14.5 % (ref 11.5–14.5)
GLUCOSE SERPL-MCNC: 113 MG/DL (ref 74–106)
HCT VFR BLD CALC: 34.4 % (ref 42–54)
HGB BLD-MCNC: 11.7 G/DL (ref 13.5–17.5)
IMM GRANULOCYTES NFR BLD: 0.5 % (ref 0–5)
LYMPHOCYTES NFR BLD AUTO: 22.1 % (ref 15–50)
MCH RBC QN AUTO: 30.9 PG (ref 26–34)
MCHC RBC AUTO-ENTMCNC: 34 G/DL (ref 31–37)
MCV RBC: 90.8 FL (ref 80–100)
MONOCYTES NFR BLD: 11 % (ref 2–11)
NEUTROPHILS NFR BLD AUTO: 63.9 % (ref 40–80)
OSMOLALITY SERPL CALC.SUM OF ELEC: 275 MOSM/KG (ref 275–300)
PHOSPHATE SERPL-MCNC: 3.7 MG/DL (ref 2.5–4.9)
PLATELET # BLD: 357 10X3/UL (ref 130–400)
PMV BLD AUTO: 9.8 FL (ref 7.4–10.4)
POTASSIUM SERPL-SCNC: 3.3 MMOL/L (ref 3.5–5.1)
RBC # BLD AUTO: 3.79 10X6/UL (ref 4.2–6.1)
SODIUM SERPL-SCNC: 138 MMOL/L (ref 136–145)
WBC # BLD AUTO: 11.5 10X3/UL (ref 4.8–10.8)

## 2019-10-22 NOTE — MORECARE
CASE MANAGEMENT DISCHARGE SUMMARY
 
 
PATIENT: JU DEE                       UNIT: [id number]
ACCOUNT#: [id number]                       ADM DATE: 10/17/19
AGE: 34     : [birth year]  SEX: M            ROOM/BED: D.1213    
AUTHOR: PAULINO JIMENEZ                             PHYSICIAN:                               
 
REFERRING PHYSICIAN: ERIKA SMITH MD                
DATE OF SERVICE: 10/22/19
Discharge Plan
 
 
Patient Name: JU DEE
Facility: [facility]:[place]
Encounter #: [id number]
Medical Record #: [id number]
: 1985
Planned Disposition: Home
Anticipated Discharge Date: 
 
Discharge Date: 10/22/2019
Expected LOS: 
Initial Reviewer: [email or web address]
Initial Review Date: 10/18/2019
Generated: 10/22/19   4:37 pm 
DCP- Discharge Planning
 
Updated by [id number]: Blanca Guzmán on 10/18/19   1:20 pm CT
Patient Name: JU DEE                                     
Admission Status: ER   
Accout number: [id number]                              
Admission Date: 10-   
: 1985                                                        
Admission Diagnosis:   
Attending: ERIKA SMITH                                                
Current LOS:  1   
  
Anticipated DC Date:    
Planned Disposition: Home   
Primary Insurance: [employer] PPO   
  
  
Discharge Planning Comments: CM MET WITH PATIENT AFTER OBTAINING VERBAL 
CONSENT. STATES PLANS TO DISCHARGE TO HOME. DISCUSSED NEED FOR HH, REHAB OR 
EQUIPMENT, PATIENT STATES NO NEEDS. CM WILL FOLLOW AND ASSIST AS NEEDED.   
  
  
  
  
 
  
  
: Blanca Guzmán
 DCPIA - Discharge Planning Initial Assessment
 
Updated by [email or web address]: Blanca Guzmán on 10/18/19   2:19 pm
*  Is the patient Alert and Oriented?
Yes
*  PCP
NONE
*  Pharmacy
[facility]
*  Preadmission Environment
Home with Family
*  ADLs
Independent
*  Other Equipment
NONE
*  List name and contact numbers for known caregivers / representatives who 
currently or will assist patient after discharge:
BELENREA, SPOUSE, [phone]
*  Please name any agencies selected above.
NONE
*  Additional services required to return to the preadmission environment?
No
*  Can the patient safely return to the preadmission environment?
Yes
*  Has this patient been hospitalized within the prior 30 days at any 
hospital?
No
 
 
 
 
 
 
 
Last DP export: 10/18/19   1:30 
Patient Name: JU DEE
 
Encounter #: [id number]
Page [phone]
 
 
 
 
 
Electronically Signed by PAULINO JIMENEZ on 10/22/19 at 1537
 
 
 
 
 
 
**All edits/amendments must be made on the electronic document**
 
DICTATION DATE: 10/22/19 1537     : KELLY  10/22/19 1537     
RPT#: [id number]                                DC DATE:10/22/19
                                               STATUS: DIS IN  
[facility]
 [place] [address]
***END OF REPORT***